# Patient Record
(demographics unavailable — no encounter records)

---

## 2025-07-03 NOTE — PHYSICAL EXAM
[de-identified] :  Gen: NAD Resp: Nonlabored respirations, no SOB   Shoulder: Skin intact - ecchymosis Tender over posterolateral deltoid Arom shoulder deferred full elbow/wrist/hand rom Firing D B T EPL FDP IO SILT amur 2+ rad bcr   [de-identified] : I independently reviewed and interpreted the xrays - no fracture, no dislocation or OA.  No other acute osseous abnormalities.  GT fx

## 2025-07-03 NOTE — HISTORY OF PRESENT ILLNESS
[de-identified] : 59yM pw R proximal humerus fracture.  patient pw shoulder pain after an injury.  Pt leads a very active lifestyle but the persistent shoulder pain has impacted this.  Rates the pain as 6/10, worse when laying directly on that side and has impaired sleep quality.  Has tried to remain in a sling, slight shock like feeling toward hand initially but this has resolved.  NSAIDs have helped somewhat to alleviate the pain, which is located in the anterior shoulder and radiates to mid arm.  Denies stiffness, numbness, paresthesias.  Denies formal treatment such as PT, injections and therapy and would like to obtain a diagnosis and discuss treatment options. No hx of dislocations or hyperlaxity.

## 2025-07-03 NOTE — DISCUSSION/SUMMARY
[de-identified] : 59yM pw R GT frx, nondisplaced.  The patient was extensively counseled on treatment options including but not limited to observation, rest/activity modification, bracing, anti-inflammatory medications, physical therapy, injections, and surgery.  The natural history of the disease was thoroughly explained.  We discussed that the majority of the time, this condition can be initially treated conservatively. The patient will proceed with: -sling -PT -diclofenac rx provided - pt instructed to take with meals and not with other nsaid's including advil, aleve, and toradol.  The pt understands to stop taking the medication if any stomach sx develop or they develop any type of bleeding or bruising, at which point they will present to their PMD -pt was instructed on the importance of resting, icing and elevating to minimize swelling -RTC 6w new shoulder xr   I have personally obtained the history, reviewed the ROS as noted, and performed the physical examination today.  The patient and I discussed the assessment and options and developed the plan.  All questions were answered and the patient stated their understanding of the treatment plan and appreciation of the visit.   My cumulative time spent on this patient's visit included: Preparation for the visit, review of the medical records, review of pertinent diagnostic studies, examination and counseling of the patient on the above diagnosis, treatment plan and prognosis, orders of diagnostic tests, medications and/or appropriate procedures and documentation in the medical records of today's visit.   Thad Villegas MD